# Patient Record
Sex: MALE | Race: BLACK OR AFRICAN AMERICAN | NOT HISPANIC OR LATINO | Employment: FULL TIME | URBAN - METROPOLITAN AREA
[De-identification: names, ages, dates, MRNs, and addresses within clinical notes are randomized per-mention and may not be internally consistent; named-entity substitution may affect disease eponyms.]

---

## 2019-09-06 ENCOUNTER — APPOINTMENT (EMERGENCY)
Dept: RADIOLOGY | Facility: HOSPITAL | Age: 69
End: 2019-09-06
Payer: MEDICARE

## 2019-09-06 ENCOUNTER — HOSPITAL ENCOUNTER (EMERGENCY)
Facility: HOSPITAL | Age: 69
Discharge: HOME | End: 2019-09-06
Attending: EMERGENCY MEDICINE | Admitting: EMERGENCY MEDICINE
Payer: MEDICARE

## 2019-09-06 VITALS
BODY MASS INDEX: 22.96 KG/M2 | OXYGEN SATURATION: 94 % | HEIGHT: 69 IN | RESPIRATION RATE: 20 BRPM | DIASTOLIC BLOOD PRESSURE: 71 MMHG | SYSTOLIC BLOOD PRESSURE: 128 MMHG | HEART RATE: 74 BPM | TEMPERATURE: 98.7 F | WEIGHT: 155 LBS

## 2019-09-06 DIAGNOSIS — R55 SYNCOPE, CONVULSIVE: ICD-10-CM

## 2019-09-06 DIAGNOSIS — R55 SYNCOPE: Primary | ICD-10-CM

## 2019-09-06 DIAGNOSIS — B20 HIV (HUMAN IMMUNODEFICIENCY VIRUS INFECTION) (HCC): ICD-10-CM

## 2019-09-06 LAB
ALBUMIN SERPL BCP-MCNC: 3.5 G/DL (ref 3.5–5)
ALP SERPL-CCNC: 78 U/L (ref 46–116)
ALT SERPL W P-5'-P-CCNC: 34 U/L (ref 12–78)
AMPHETAMINES SERPL QL SCN: NEGATIVE
ANION GAP SERPL CALCULATED.3IONS-SCNC: 11 MMOL/L (ref 4–13)
AST SERPL W P-5'-P-CCNC: 21 U/L (ref 5–45)
ATRIAL RATE: 68 BPM
ATRIAL RATE: 69 BPM
BARBITURATES UR QL: NEGATIVE
BASOPHILS # BLD AUTO: 0.04 THOUSANDS/ΜL (ref 0–0.1)
BASOPHILS NFR BLD AUTO: 1 % (ref 0–1)
BENZODIAZ UR QL: NEGATIVE
BILIRUB SERPL-MCNC: 0.46 MG/DL (ref 0.2–1)
BILIRUB UR QL STRIP: NEGATIVE
BUN SERPL-MCNC: 14 MG/DL (ref 5–25)
CALCIUM SERPL-MCNC: 8.6 MG/DL (ref 8.3–10.1)
CHLORIDE SERPL-SCNC: 109 MMOL/L (ref 100–108)
CK SERPL-CCNC: 52 U/L (ref 39–308)
CLARITY UR: CLEAR
CLARITY, POC: CLEAR
CO2 SERPL-SCNC: 22 MMOL/L (ref 21–32)
COCAINE UR QL: NEGATIVE
COLOR UR: YELLOW
COLOR, POC: YELLOW
CREAT SERPL-MCNC: 0.7 MG/DL (ref 0.6–1.3)
EOSINOPHIL # BLD AUTO: 0.05 THOUSAND/ΜL (ref 0–0.61)
EOSINOPHIL NFR BLD AUTO: 1 % (ref 0–6)
ERYTHROCYTE [DISTWIDTH] IN BLOOD BY AUTOMATED COUNT: 14.8 % (ref 11.6–15.1)
ETHANOL SERPL-MCNC: 28 MG/DL (ref 0–3)
GFR SERPL CREATININE-BSD FRML MDRD: 113 ML/MIN/1.73SQ M
GLUCOSE SERPL-MCNC: 117 MG/DL (ref 65–140)
GLUCOSE UR STRIP-MCNC: NEGATIVE MG/DL
HCT VFR BLD AUTO: 39.4 % (ref 36.5–49.3)
HGB BLD-MCNC: 12.8 G/DL (ref 12–17)
HGB UR QL STRIP.AUTO: NEGATIVE
IMM GRANULOCYTES # BLD AUTO: 0.01 THOUSAND/UL (ref 0–0.2)
IMM GRANULOCYTES NFR BLD AUTO: 0 % (ref 0–2)
KETONES UR STRIP-MCNC: NEGATIVE MG/DL
LEUKOCYTE ESTERASE UR QL STRIP: NEGATIVE
LYMPHOCYTES # BLD AUTO: 0.97 THOUSANDS/ΜL (ref 0.6–4.47)
LYMPHOCYTES NFR BLD AUTO: 18 % (ref 14–44)
MCH RBC QN AUTO: 29.8 PG (ref 26.8–34.3)
MCHC RBC AUTO-ENTMCNC: 32.5 G/DL (ref 31.4–37.4)
MCV RBC AUTO: 92 FL (ref 82–98)
METHADONE UR QL: NEGATIVE
MONOCYTES # BLD AUTO: 0.5 THOUSAND/ΜL (ref 0.17–1.22)
MONOCYTES NFR BLD AUTO: 9 % (ref 4–12)
NEUTROPHILS # BLD AUTO: 3.8 THOUSANDS/ΜL (ref 1.85–7.62)
NEUTS SEG NFR BLD AUTO: 71 % (ref 43–75)
NITRITE UR QL STRIP: NEGATIVE
NRBC BLD AUTO-RTO: 0 /100 WBCS
OPIATES UR QL SCN: NEGATIVE
P AXIS: 31 DEGREES
P AXIS: 32 DEGREES
PCP UR QL: NEGATIVE
PH UR STRIP.AUTO: 5.5 [PH] (ref 4.5–8)
PLATELET # BLD AUTO: 284 THOUSANDS/UL (ref 149–390)
PMV BLD AUTO: 9.4 FL (ref 8.9–12.7)
POTASSIUM SERPL-SCNC: 3.1 MMOL/L (ref 3.5–5.3)
PR INTERVAL: 242 MS
PR INTERVAL: 248 MS
PROLACTIN SERPL-MCNC: 15.4 NG/ML (ref 2.5–17.4)
PROT SERPL-MCNC: 7.3 G/DL (ref 6.4–8.2)
PROT UR STRIP-MCNC: NEGATIVE MG/DL
QRS AXIS: 15 DEGREES
QRS AXIS: 17 DEGREES
QRSD INTERVAL: 98 MS
QRSD INTERVAL: 98 MS
QT INTERVAL: 392 MS
QT INTERVAL: 394 MS
QTC INTERVAL: 416 MS
QTC INTERVAL: 422 MS
RBC # BLD AUTO: 4.29 MILLION/UL (ref 3.88–5.62)
SODIUM SERPL-SCNC: 142 MMOL/L (ref 136–145)
SP GR UR STRIP.AUTO: 1.02 (ref 1–1.03)
T WAVE AXIS: 6 DEGREES
T WAVE AXIS: 7 DEGREES
THC UR QL: POSITIVE
TROPONIN I SERPL-MCNC: <0.02 NG/ML
UROBILINOGEN UR QL STRIP.AUTO: 4 E.U./DL
VENTRICULAR RATE: 68 BPM
VENTRICULAR RATE: 69 BPM
WBC # BLD AUTO: 5.37 THOUSAND/UL (ref 4.31–10.16)

## 2019-09-06 PROCEDURE — 80307 DRUG TEST PRSMV CHEM ANLYZR: CPT | Performed by: EMERGENCY MEDICINE

## 2019-09-06 PROCEDURE — 99284 EMERGENCY DEPT VISIT MOD MDM: CPT | Performed by: EMERGENCY MEDICINE

## 2019-09-06 PROCEDURE — 82550 ASSAY OF CK (CPK): CPT | Performed by: EMERGENCY MEDICINE

## 2019-09-06 PROCEDURE — 99285 EMERGENCY DEPT VISIT HI MDM: CPT

## 2019-09-06 PROCEDURE — 93005 ELECTROCARDIOGRAM TRACING: CPT

## 2019-09-06 PROCEDURE — 84146 ASSAY OF PROLACTIN: CPT | Performed by: EMERGENCY MEDICINE

## 2019-09-06 PROCEDURE — 93010 ELECTROCARDIOGRAM REPORT: CPT | Performed by: INTERNAL MEDICINE

## 2019-09-06 PROCEDURE — 96361 HYDRATE IV INFUSION ADD-ON: CPT

## 2019-09-06 PROCEDURE — 72125 CT NECK SPINE W/O DYE: CPT

## 2019-09-06 PROCEDURE — 81003 URINALYSIS AUTO W/O SCOPE: CPT

## 2019-09-06 PROCEDURE — 85025 COMPLETE CBC W/AUTO DIFF WBC: CPT | Performed by: EMERGENCY MEDICINE

## 2019-09-06 PROCEDURE — 80320 DRUG SCREEN QUANTALCOHOLS: CPT | Performed by: EMERGENCY MEDICINE

## 2019-09-06 PROCEDURE — 36415 COLL VENOUS BLD VENIPUNCTURE: CPT | Performed by: EMERGENCY MEDICINE

## 2019-09-06 PROCEDURE — 80053 COMPREHEN METABOLIC PANEL: CPT | Performed by: EMERGENCY MEDICINE

## 2019-09-06 PROCEDURE — 96360 HYDRATION IV INFUSION INIT: CPT

## 2019-09-06 PROCEDURE — 84484 ASSAY OF TROPONIN QUANT: CPT | Performed by: EMERGENCY MEDICINE

## 2019-09-06 PROCEDURE — 70450 CT HEAD/BRAIN W/O DYE: CPT

## 2019-09-06 RX ORDER — POTASSIUM CHLORIDE 20MEQ/15ML
20 LIQUID (ML) ORAL ONCE
Status: COMPLETED | OUTPATIENT
Start: 2019-09-06 | End: 2019-09-06

## 2019-09-06 RX ADMIN — POTASSIUM CHLORIDE 20 MEQ: 20 SOLUTION ORAL at 04:44

## 2019-09-06 RX ADMIN — SODIUM CHLORIDE 1000 ML: 0.9 INJECTION, SOLUTION INTRAVENOUS at 03:42

## 2019-09-06 NOTE — ED ATTENDING ATTESTATION
Emergency Department Note- Helio Lees  81 y o  male MRN: [id number]    Unit/Bed#: ED 11 Encounter: [id number]    Helio Lees  is a 81 y o  male who presents with   Chief Complaint   Patient presents with    Seizure - New Onset     Patient is coming in from [facility]; was drinking and gambling tonight when he started feeling sweaty and not feel well; cameras caught him having what appeared to be a seizure for about 30 seconds          History of Present Illness   HPI:  Helio Lees  is a 81 y o  male who presents for evaluation of:  Syncope episode my some myoclonic activity  Patient was drinking etoh and gambling at the [employer] tonight  Patient felt sweaty and then passed out  Patient has no h/o seizure disorder  Patient denies CV disease  Patient denies illicit drug abuse  Review of Systems   Constitutional: Positive for diaphoresis  Negative for chills, fatigue and fever  Respiratory: Negative for chest tightness and shortness of breath  Cardiovascular: Negative for chest pain and palpitations  Neurological: Positive for syncope and light-headedness  Psychiatric/Behavioral: Negative for dysphoric mood and hallucinations  All other systems reviewed and are negative  Historical Information   Past Medical History:   Diagnosis Date    HIV (human immunodeficiency virus infection) ([facility] [address] )      History reviewed  No pertinent surgical history    Social History   Social History     Substance and Sexual Activity   Alcohol Use Yes     Social History     Substance and Sexual Activity   Drug Use Never     Social History     Tobacco Use   Smoking Status Current Every Day Smoker    Packs/day: 0 50   Smokeless Tobacco Never Used     Family History: non-contributory    Meds/Allergies   all medications and allergies reviewed  No Known Allergies    Objective   First Vitals:   Blood Pressure: 129/76 (09/06/19 0310)  Pulse: 74 (09/06/19 0310)  Temperature: 98 7 °F (37 1 °C) (09/06/19 310)  Temp Source: Oral (19)  Respirations: 20 (19)  Height: 5' 9" (175 3 cm) (19)  Weight - Scale: 70 3 kg (155 lb) (19)  SpO2: 97 % (19)    Current Vitals:   Blood Pressure: 129/76 (19)  Pulse: 74 (19)  Temperature: 98 7 °F (37 1 °C) (19)  Temp Source: Oral (19)  Respirations: 20 (19)  Height: 5' 9" (175 3 cm) (19)  Weight - Scale: 70 3 kg (155 lb) (19)  SpO2: 97 % (19)    No intake or output data in the 24 hours ending 19 0422    Invasive Devices     Peripheral Intravenous Line            Peripheral IV 19 Left Antecubital less than 1 day                Physical Exam   Constitutional: He is oriented to person, place, and time  He appears well-developed and well-nourished  Cardiovascular: Normal rate and regular rhythm  Pulmonary/Chest: Effort normal and breath sounds normal    Abdominal: Soft  Bowel sounds are normal    Musculoskeletal: Normal range of motion  He exhibits no deformity  Neurological: He is alert and oriented to person, place, and time  Skin: Skin is warm and dry  Capillary refill takes less than 2 seconds  Psychiatric: He has a normal mood and affect  His behavior is normal  Judgment and thought content normal    Nursing note and vitals reviewed  Medical Decision Makin  Syncope and myoclonus: suspect neurocardiogenic syncope; ETOH level r/o alcohol intoxication; ECG r/o arrhythmia;  Tn r/o ACS; CTH r/o ICH    Recent Results (from the past 36 hour(s))   CBC and differential    Collection Time: 19  3:35 AM   Result Value Ref Range    WBC 5 37 4 31 - 10 16 Thousand/uL    RBC 4 29 3 88 - 5 62 Million/uL    Hemoglobin 12 8 12 0 - 17 0 g/dL    Hematocrit 39 4 36 5 - 49 3 %    MCV 92 82 - 98 fL    MCH 29 8 26 8 - 34 3 pg    MCHC 32 5 31 4 - 37 4 g/dL    RDW 14 8 11 6 - 15 1 %    MPV 9 4 8 9 - 12 7 fL    Platelets 603 746 - 390 Thousands/uL    nRBC 0 /100 WBCs    Neutrophils Relative 71 43 - 75 %    Immat GRANS % 0 0 - 2 %    Lymphocytes Relative 18 14 - 44 %    Monocytes Relative 9 4 - 12 %    Eosinophils Relative 1 0 - 6 %    Basophils Relative 1 0 - 1 %    Neutrophils Absolute 3 80 1 85 - 7 62 Thousands/µL    Immature Grans Absolute 0 01 0 00 - 0 20 Thousand/uL    Lymphocytes Absolute 0 97 0 60 - 4 47 Thousands/µL    Monocytes Absolute 0 50 0 17 - 1 22 Thousand/µL    Eosinophils Absolute 0 05 0 00 - 0 61 Thousand/µL    Basophils Absolute 0 04 0 00 - 0 10 Thousands/µL   Comprehensive metabolic panel    Collection Time: 09/06/19  3:35 AM   Result Value Ref Range    Sodium 142 136 - 145 mmol/L    Potassium 3 1 (L) 3 5 - 5 3 mmol/L    Chloride 109 (H) 100 - 108 mmol/L    CO2 22 21 - 32 mmol/L    ANION GAP 11 4 - 13 mmol/L    BUN 14 5 - 25 mg/dL    Creatinine 0 70 0 60 - 1 30 mg/dL    Glucose 117 65 - 140 mg/dL    Calcium 8 6 8 3 - 10 1 mg/dL    AST 21 5 - 45 U/L    ALT 34 12 - 78 U/L    Alkaline Phosphatase 78 46 - 116 U/L    Total Protein 7 3 6 4 - 8 2 g/dL    Albumin 3 5 3 5 - 5 0 g/dL    Total Bilirubin 0 46 0 20 - 1 00 mg/dL    eGFR 113 ml/min/1 73sq m   CK Total with Reflex CKMB    Collection Time: 09/06/19  3:35 AM   Result Value Ref Range    Total CK 52 39 - 308 U/L   Prolactin    Collection Time: 09/06/19  3:35 AM   Result Value Ref Range    Prolactin 15 4 2 5 - 17 4 ng/mL   Troponin I    Collection Time: 09/06/19  3:35 AM   Result Value Ref Range    Troponin I <0 02 <=0 04 ng/mL   Ethanol    Collection Time: 09/06/19  3:35 AM   Result Value Ref Range    Ethanol Lvl 28 (H) 0 - 3 mg/dL     XR chest 2 views    (Results Pending)   CT head without contrast    (Results Pending)   CT cervical spine without contrast    (Results Pending)         Portions of the record may have been created with voice recognition software   Occasional wrong word or "sound a like" substitutions may have occurred due to the inherent limitations of voice recognition software  Read the chart carefully and recognize, using context, where substitutions have occurred

## 2019-09-06 NOTE — ED PROCEDURE NOTE
PROCEDURE  ECG 12 Lead Documentation Only  Date/Time: 9/6/2019 3:36 AM  Performed by: Conrad Doyle MD  Authorized by: Conrad Doyle MD     Indications / Diagnosis:  Syncope  ECG reviewed by me, the ED Provider: yes    Patient location:  ED  Previous ECG:     Previous ECG:  Unavailable  Interpretation:     Interpretation: non-specific    Rate:     ECG rate:  68    ECG rate assessment: normal    Rhythm:     Rhythm: sinus rhythm    Ectopy:     Ectopy: none    QRS:     QRS axis:  Normal    QRS intervals:  Normal  Conduction:     Conduction: abnormal      Abnormal conduction: 1st degree    ST segments:     ST segments:  Normal  T waves:     T waves: non-specific           Conrad Doyle MD  09/06/19 6628

## 2019-09-06 NOTE — ED PROVIDER NOTES
History  Chief Complaint   Patient presents with    Seizure - New Onset     Patient is coming in from [facility]; was drinking and gambling tonight when he started feeling sweaty and not feel well; cameras caught him having what appeared to be a seizure for about 30 seconds      HPI    61-year-old male who presents to the ED for evaluation after a syncopal event  Patient was found at when [facility], where he was drinking and gambling, when he stated he felt sweaty  He said he fell to the ground onto his hips, he denies any SI, he states he did not lose consciousness  However care was did require that the patient had rhythmic movements of his upper extremities  The patient denies urinary incontinence, he denies biting his tongue  He denied any head strike  He currently states he has no symptoms he has no pain, denies any other symptoms on ROS  Patient states he does have a history of HIV, he is on antiretrovirals, and that his levels are undetectable  Camelia Settle He also states that he has hypertension on lisinopril  None       Past Medical History:   Diagnosis Date    HIV (human immunodeficiency virus infection) ([facility] [address] )        History reviewed  No pertinent surgical history  History reviewed  No pertinent family history  I have reviewed and agree with the history as documented  Social History     Tobacco Use    Smoking status: Current Every Day Smoker     Packs/day: 0 50    Smokeless tobacco: Never Used   Substance Use Topics    Alcohol use: Yes    Drug use: Never        Review of Systems   Constitutional: Negative for chills, fatigue and fever  HENT: Negative for nosebleeds and sore throat  Eyes: Negative for redness and visual disturbance  Respiratory: Negative for shortness of breath and wheezing  Cardiovascular: Negative for chest pain and palpitations  Gastrointestinal: Negative for abdominal pain and diarrhea  Endocrine: Negative for polyuria     Genitourinary: Negative for difficulty urinating and testicular pain  Musculoskeletal: Negative for back pain and neck stiffness  Skin: Negative for rash and wound  Neurological: Negative for seizures, speech difficulty and headaches  Psychiatric/Behavioral: Negative for dysphoric mood and hallucinations  All other systems reviewed and are negative  Physical Exam  ED Triage Vitals [09/06/19 0310]   Temperature Pulse Respirations Blood Pressure SpO2   98 7 °F (37 1 °C) 74 20 129/76 97 %      Temp Source Heart Rate Source Patient Position - Orthostatic VS BP Location FiO2 (%)   Oral Monitor Lying Right arm --      Pain Score       No Pain             Orthostatic Vital Signs  Vitals:    09/06/19 0310 09/06/19 0430 09/06/19 0445   BP: 129/76 126/71 128/71   Pulse: 74 88 74   Patient Position - Orthostatic VS: Lying Lying Lying       Physical Exam   Constitutional: He is oriented to person, place, and time  He appears well-developed and well-nourished  HENT:   Head: Normocephalic and atraumatic  Right Ear: External ear normal    Left Ear: External ear normal    Mouth/Throat: Oropharynx is clear and moist    Eyes: Conjunctivae and EOM are normal    Neck: Normal range of motion  Cardiovascular: Normal rate, regular rhythm, normal heart sounds and intact distal pulses  Pulmonary/Chest: Effort normal and breath sounds normal  He has no wheezes  He exhibits no tenderness  Abdominal: Soft  Bowel sounds are normal  There is no tenderness  There is no guarding  Musculoskeletal: Normal range of motion  Neurological: He is alert and oriented to person, place, and time  No cranial nerve deficit or sensory deficit  He exhibits normal muscle tone  Coordination normal    Skin: Skin is warm and dry  No rash noted  Psychiatric: He has a normal mood and affect  Nursing note and vitals reviewed        ED Medications  Medications   sodium chloride 0 9 % bolus 1,000 mL (0 mL Intravenous Stopped 9/6/19 0629)   potassium chloride 10 % oral solution 20 mEq (20 mEq Oral Given 9/6/19 0444)   potassium chloride 10 % oral solution 20 mEq (20 mEq Oral Given 9/6/19 0444)       Diagnostic Studies  Results Reviewed     Procedure Component Value Units Date/Time    Rapid drug screen, urine [[id number]]  (Abnormal) Collected:  09/06/19 0441    Lab Status:  Final result Specimen:  Urine, Clean Catch Updated:  09/06/19 0511     Amph/Meth UR Negative     Barbiturate Ur Negative     Benzodiazepine Urine Negative     Cocaine Urine Negative     Methadone Urine Negative     Opiate Urine Negative     PCP Ur Negative     THC Urine Positive    Narrative:       Presumptive report  If requested, specimen will be sent to reference lab for confirmation  FOR MEDICAL PURPOSES ONLY  IF CONFIRMATION NEEDED PLEASE CONTACT THE LAB WITHIN 5 DAYS      Drug Screen Cutoff Levels:  AMPHETAMINE/METHAMPHETAMINES  1000 ng/mL  BARBITURATES     200 ng/mL  BENZODIAZEPINES     200 ng/mL  COCAINE      300 ng/mL  METHADONE      300 ng/mL  OPIATES      300 ng/mL  PHENCYCLIDINE     25 ng/mL  THC       50 ng/mL      POCT urinalysis dipstick [[id number]]  (Normal) Resulted:  09/06/19 0441    Lab Status:  Final result Specimen:  Urine Updated:  09/06/19 0441     Color, UA yellow     Clarity, UA clear    ED Urine Macroscopic [[id number]]  (Abnormal) Collected:  09/06/19 0441    Lab Status:  Final result Specimen:  Urine Updated:  09/06/19 0440     Color, UA Yellow     Clarity, UA Clear     pH, UA 5 5     Leukocytes, UA Negative     Nitrite, UA Negative     Protein, UA Negative mg/dl      Glucose, UA Negative mg/dl      Ketones, UA Negative mg/dl      Urobilinogen, UA 4 0 E U /dl      Bilirubin, UA Negative     Blood, UA Negative     Specific Gravity, UA 1 020    Narrative:       CLINITEK RESULT    Prolactin [[id number]]  (Normal) Collected:  09/06/19 0335    Lab Status:  Final result Specimen:  Blood from Arm, Left Updated:  09/06/19 0402     Prolactin 15 4 ng/mL     Comprehensive metabolic panel [[id number]]  (Abnormal) Collected:  09/06/19 0335    Lab Status:  Final result Specimen:  Blood from Arm, Left Updated:  09/06/19 0400     Sodium 142 mmol/L      Potassium 3 1 mmol/L      Chloride 109 mmol/L      CO2 22 mmol/L      ANION GAP 11 mmol/L      BUN 14 mg/dL      Creatinine 0 70 mg/dL      Glucose 117 mg/dL      Calcium 8 6 mg/dL      AST 21 U/L      ALT 34 U/L      Alkaline Phosphatase 78 U/L      Total Protein 7 3 g/dL      Albumin 3 5 g/dL      Total Bilirubin 0 46 mg/dL      eGFR 113 ml/min/1 73sq m     Narrative:       [facility] guidelines for Chronic Kid[facility] Disease (CKD):     Stage 1 with normal or high GFR (GFR > 90 mL/min/1 73 square meters)    Stage 2 Mild CKD (GFR = 60-89 mL/min/1 73 square meters)    Stage 3A Moderate CKD (GFR = 45-59 mL/min/1 73 square meters)    Stage 3B Moderate CKD (GFR = 30-44 mL/min/1 73 square meters)    Stage 4 Severe CKD (GFR = 15-29 mL/min/1 73 square meters)    Stage 5 End Stage CKD (GFR <15 mL/min/1 73 square meters)  Note: GFR calculation is accurate only with a steady state creatinine    CK Total with Reflex CKMB [[id number]]  (Normal) Collected:  09/06/19 0335    Lab Status:  Final result Specimen:  Blood from Arm, Left Updated:  09/06/19 0400     Total CK 52 U/L     Troponin I [[id number]]  (Normal) Collected:  09/06/19 0335    Lab Status:  Final result Specimen:  Blood from Arm, Left Updated:  09/06/19 0359     Troponin I <0 02 ng/mL     Ethanol [[id number]]  (Abnormal) Collected:  09/06/19 0335    Lab Status:  Final result Specimen:  Blood from Arm, Left Updated:  09/06/19 0355     Ethanol Lvl 28 mg/dL     CBC and differential [[id number]] Collected:  09/06/19 0335    Lab Status:  Final result Specimen:  Blood from Arm, Left Updated:  09/06/19 0350     WBC 5 37 Thousand/uL      RBC 4 29 Million/uL      Hemoglobin 12 8 g/dL      Hematocrit 39 4 %      MCV 92 fL      MCH 29 8 pg      MCHC 32 5 g/dL      RDW 14 8 %      MPV 9 4 fL Platelets 864 Thousands/uL      nRBC 0 /100 WBCs      Neutrophils Relative 71 %      Immat GRANS % 0 %      Lymphocytes Relative 18 %      Monocytes Relative 9 %      Eosinophils Relative 1 %      Basophils Relative 1 %      Neutrophils Absolute 3 80 Thousands/µL      Immature Grans Absolute 0 01 Thousand/uL      Lymphocytes Absolute 0 97 Thousands/µL      Monocytes Absolute 0 50 Thousand/µL      Eosinophils Absolute 0 05 Thousand/µL      Basophils Absolute 0 04 Thousands/µL                  CT head without contrast   Final Result by Lissette Driver DO (09/06 8603)   Mild cerebral atrophy with chronic small vessel ischemic change  No acute intracranial abnormality  Workstation performed: [id number]         CT cervical spine without contrast   Final Result by Lissette Driver DO (09/06 0600)   Moderate degenerative changes of the cervical spine, most pronounced C5-C6  Endplate irregularity at this level likely degenerative and/or chronic posttraumatic  No acute cervical spine fracture or traumatic malalignment                     Workstation performed: [id number]               Procedures  ECG 12 Lead Documentation Only  Date/Time: 9/6/2019 6:09 AM  Performed by: Micky Keith MD  Authorized by: Micky Keith MD     Indications / Diagnosis:  Syncope  ECG reviewed by me, the ED Provider: yes    Patient location:  ED  Previous ECG:     Previous ECG:  Unavailable  Interpretation:     Interpretation: abnormal    Rate:     ECG rate:  69    ECG rate assessment: normal    Rhythm:     Rhythm: sinus rhythm and A-V block    Ectopy:     Ectopy: none    QRS:     QRS axis:  Normal  Conduction:     Conduction: normal    ST segments:     ST segments:  Normal  T waves:     T waves: normal              ED Course  ED Course as of Sep 06 0708   Fri Sep 06, 2019   0417 PROLACTIN: 15 4   0417 Total CK: 52   0417 Troponin I: <0 02   0541 THC URINE(!): Positive           Identification of Seniors at Risk Most Recent Value   (ISAR) Identification of Seniors at Risk   Before the illness or injury that brought you to the Emergency, did you need someone to help you on a regular basis? 0 Filed at: 09/06/2019 1106   In the last 24 hours, have you needed more help than usual?  0 Filed at: 09/06/2019 9555   Have you been hospitalized for one or more nights during the past 6 months? 0 Filed at: 09/06/2019 7617   In general, do you see well?  0 Filed at: 09/06/2019 3788   In general, do you have serious problems with your memory? 0 Filed at: 09/06/2019 7289   Do you take more than three different medications every day?  0 Filed at: 09/06/2019 1277   ISAR Score  0 Filed at: 09/06/2019 3889                          [facility]      53-year-old female who presents to the ED for evaluation after likely syncopal episode  Will get a syncope workup, including labs, head CT  Patient has no symptoms at this time, will re-evaluate  Patient still remains asymptomatic, his labs are unremarkable, potassium was repleted  Head CT was noted to be unremarkable  Alcohol level minimal at 28  Patient states that he would not like to be admitted for monitoring for a arrhythmias  Patient was discharged with PCP follow-up  Patient's PCP is in [place], Dr Kristine Luciano  The patient was instructed to follow up as documented  Strict return precautions were discussed with the patient and the patient was instructed to return to the emergency department immediately if symptoms worsen  The patient/patient family member acknowledged and were in agreement with plan         Disposition  Final diagnoses:   Syncope   Syncope, convulsive   HIV (human immunodeficiency virus infection) ([facility] [address] )     Time reflects when diagnosis was documented in both MDM as applicable and the Disposition within this note     Time User Action Codes Description Comment    9/6/2019  6:17 AM Alisa Holy Add [R55] Syncope     9/6/2019  6:17 AM Alisa Holy Add [R55] Syncope, convulsive     9/6/2019  6:18 AM Hunter Olea Add [B20] HIV (human immunodeficiency virus infection) [facility])       ED Disposition     ED Disposition Condition Date/Time Comment    Discharge Stable Fri Sep 6, 2019  6:17 AM Oliver Mendoza  discharge to home/self care  Follow-up Information     Follow up With Specialties Details Why Contact Info Additional Information    Your PCP  Schedule an appointment as soon as possible for a visit in 3 days For follow up regarding your symptoms and recheck      [address] [place] [address] Emergency Department Emergency Medicine Go to  If symptoms worsen or return [address] [facility] ED, [address], [address]          There are no discharge medications for this patient  No discharge procedures on file  ED Provider  Attending physically available and evaluated Oliver Mendoza I managed the patient along with the ED Attending      Electronically Signed by         Martine Mitchell MD  09/06/19 3571